# Patient Record
Sex: MALE | Race: WHITE
[De-identification: names, ages, dates, MRNs, and addresses within clinical notes are randomized per-mention and may not be internally consistent; named-entity substitution may affect disease eponyms.]

---

## 2017-01-12 ENCOUNTER — HOSPITAL ENCOUNTER (OUTPATIENT)
Dept: HOSPITAL 35 - PAIN | Age: 62
Discharge: HOME | End: 2017-01-12
Attending: ANESTHESIOLOGY
Payer: COMMERCIAL

## 2017-01-12 VITALS — HEIGHT: 74.02 IN | WEIGHT: 250 LBS | BODY MASS INDEX: 32.08 KG/M2

## 2017-01-12 VITALS — SYSTOLIC BLOOD PRESSURE: 143 MMHG | DIASTOLIC BLOOD PRESSURE: 93 MMHG

## 2017-01-12 DIAGNOSIS — M54.81: ICD-10-CM

## 2017-01-12 DIAGNOSIS — I10: ICD-10-CM

## 2017-01-12 DIAGNOSIS — F17.210: ICD-10-CM

## 2017-01-12 DIAGNOSIS — M54.12: Primary | ICD-10-CM

## 2017-01-12 LAB
INR PPP: 1
PROTHROMBIN TIME: 10.2 SECONDS (ref 9.3–11.4)

## 2017-01-13 ENCOUNTER — HOSPITAL ENCOUNTER (OUTPATIENT)
Dept: HOSPITAL 35 - PAIN | Age: 62
End: 2017-01-13
Attending: ANESTHESIOLOGY
Payer: COMMERCIAL

## 2017-01-13 VITALS — WEIGHT: 250 LBS | BODY MASS INDEX: 32.08 KG/M2 | HEIGHT: 74.02 IN

## 2017-01-13 VITALS — SYSTOLIC BLOOD PRESSURE: 137 MMHG | DIASTOLIC BLOOD PRESSURE: 85 MMHG

## 2017-01-13 DIAGNOSIS — M54.12: Primary | ICD-10-CM

## 2017-01-13 DIAGNOSIS — F17.210: ICD-10-CM

## 2017-01-13 DIAGNOSIS — I10: ICD-10-CM

## 2017-05-18 ENCOUNTER — HOSPITAL ENCOUNTER (OUTPATIENT)
Dept: HOSPITAL 35 - PAIN | Age: 62
End: 2017-05-18
Attending: ANESTHESIOLOGY
Payer: COMMERCIAL

## 2017-05-18 VITALS — SYSTOLIC BLOOD PRESSURE: 126 MMHG | DIASTOLIC BLOOD PRESSURE: 81 MMHG

## 2017-05-18 VITALS — WEIGHT: 252.38 LBS | HEIGHT: 74.02 IN | BODY MASS INDEX: 32.39 KG/M2

## 2017-05-18 DIAGNOSIS — M54.12: Primary | ICD-10-CM

## 2017-05-18 DIAGNOSIS — I10: ICD-10-CM

## 2017-05-18 LAB
INR PPP: 1.2
PROTHROMBIN TIME: 12 SECONDS (ref 9.3–11.4)

## 2017-09-07 ENCOUNTER — HOSPITAL ENCOUNTER (OUTPATIENT)
Dept: HOSPITAL 35 - PAIN | Age: 62
End: 2017-09-07
Attending: ANESTHESIOLOGY
Payer: COMMERCIAL

## 2017-09-07 VITALS — SYSTOLIC BLOOD PRESSURE: 136 MMHG | DIASTOLIC BLOOD PRESSURE: 86 MMHG

## 2017-09-07 VITALS — BODY MASS INDEX: 33.37 KG/M2 | WEIGHT: 260 LBS | HEIGHT: 74.02 IN

## 2017-09-07 DIAGNOSIS — I10: ICD-10-CM

## 2017-09-07 DIAGNOSIS — Z79.01: ICD-10-CM

## 2017-09-07 DIAGNOSIS — M54.12: Primary | ICD-10-CM

## 2017-09-07 DIAGNOSIS — Z98.890: ICD-10-CM

## 2017-09-07 DIAGNOSIS — Z79.899: ICD-10-CM

## 2017-09-07 DIAGNOSIS — Z88.8: ICD-10-CM

## 2017-09-07 LAB
INR PPP: 1.1
PROTHROMBIN TIME: 11.1 SECONDS (ref 9.3–11.4)

## 2017-10-19 ENCOUNTER — HOSPITAL ENCOUNTER (OUTPATIENT)
Dept: HOSPITAL 61 - PCVCIMAG | Age: 62
Discharge: HOME | End: 2017-10-19
Attending: INTERNAL MEDICINE
Payer: COMMERCIAL

## 2017-10-19 DIAGNOSIS — R19.09: ICD-10-CM

## 2017-10-19 DIAGNOSIS — Z72.0: ICD-10-CM

## 2017-10-19 DIAGNOSIS — I65.22: Primary | ICD-10-CM

## 2017-10-19 DIAGNOSIS — I87.2: ICD-10-CM

## 2017-10-19 DIAGNOSIS — Z79.01: ICD-10-CM

## 2017-10-19 DIAGNOSIS — R19.8: ICD-10-CM

## 2017-10-19 DIAGNOSIS — E78.5: ICD-10-CM

## 2017-10-19 DIAGNOSIS — M19.90: ICD-10-CM

## 2017-10-19 PROCEDURE — 93880 EXTRACRANIAL BILAT STUDY: CPT

## 2017-10-19 PROCEDURE — 93978 VASCULAR STUDY: CPT

## 2017-10-19 NOTE — PCVCIMAG
EXAM: BILATERAL CAROTID DUPLEX



INDICATION: Carotid Occlusive Disease.



FINDINGS: 

Doppler Measurements (centimeters per second):



RIGHT:  Peak CCA-87, Peak ECA-81, Diastolic ICA-40, Peak ICA-96,

ICA/CCA Ratio-1.1.



LEFT:  Peak CCA-90, Peak ECA-100, Diastolic ICA-32, Peak ICA-81,

ICA/CCA Ratio-0.9.



RIGHT CAROTID: The carotid bulb has no significant plaque. The

proximal internal carotid artery shows no significant stenosis. The

common carotid artery shows no significant stenosis. The external

carotid artery shows no significant stenosis.



LEFT CAROTID:  The carotid bulb has mild plaque. The proximal internal

carotid artery shows <40% stenosis. The common carotid artery shows no

significant stenosis. The external carotid artery shows no significant

stenosis.



Antegrade flow in both vertebral arteries.



IMPRESSION: 

No significant stenosis of the right internal carotid artery with no

significant plaque.

<40% stenosis of the left internal carotid artery with mild plaque.



LOC:[place] [id number]

## 2017-10-19 NOTE — PCVCIMAG
EXAM: AORTOILIAC DUPLEX



INDICATION: Palpable abdominal fullness. 



FINDINGS: 



AORTA: Suprarenal aorta measures maximum diameter of 2.9 cm. There is

not a fusiform infrarenal aortic aneurysm. The infrarenal aorta

measures maximum diameter of 2.5 cm. No aortic stenosis.



RIGHT COMMON ILIAC ARTERY: Maximum diameter is 1.1 cm. No significant

stenosis.



RIGHT EXTERNAL ILIAC ARTERY:  No significant stenosis.



LEFT COMMON ILIAC ARTERY: Maximum diameter is 1.3 cm.  No significant

stenosis.



LEFT EXTERNAL ILIAC ARTERY:  No significant stenosis.



IMPRESSION: 

No abdominal aortic aneurysm. No aortoiliac stenosis seen.





LOC:[id number]

## 2017-10-30 ENCOUNTER — HOSPITAL ENCOUNTER (OUTPATIENT)
Dept: HOSPITAL 61 - PCVCIMAG | Age: 62
Discharge: HOME | End: 2017-10-30
Attending: INTERNAL MEDICINE
Payer: COMMERCIAL

## 2017-10-30 DIAGNOSIS — I49.3: ICD-10-CM

## 2017-10-30 DIAGNOSIS — I42.8: Primary | ICD-10-CM

## 2017-10-30 DIAGNOSIS — E78.5: ICD-10-CM

## 2017-10-30 DIAGNOSIS — Z72.0: ICD-10-CM

## 2017-10-30 PROCEDURE — 93351 STRESS TTE COMPLETE: CPT

## 2017-10-30 PROCEDURE — 93325 DOPPLER ECHO COLOR FLOW MAPG: CPT

## 2017-10-30 NOTE — PCVCIMAG
--------------- APPROVED REPORT --------------





Exam:  Stress Echocardiogram

Indication: Nonischemic cardiomyopathy, tobacco use, HLP

Patient Location: Echo lab

Stress Nurse: Janelle Maldonado RN

Status: routine



Ht: 6 ft 2 in  

HR: 91 bpm      BP: 144/88 mmHg

Rhythm: NSR w/ PVCs



Procedure

The patient underwent an Exercise Stress Test using the Tay 

Protocol. Blood pressure, heart rate, and EKG were monitored.

An Echocardiogram was performed by technician in four stages in quad 

fashion.  At peak stress, four selected images were obtained and 

placed side by side with resting images for comparison.



Stress Test Details

Stress Test:  Exercise stress testing was performed using a Tay 

protocol.

HR

Resting HR:            91 bpmMax Heart Rate (APMHR): 160 bpm 

Max HR Achieved:  131 bpmTarget HR (85% APMHR): 136 bpm

% of APMHR:         81

Recovery HR:            100 bpm

HR response to stress: Normal HR response to stress



BP

Resting BP:  144/88 mmHg

Max BP:       168/78 mmHg

Recovery BP:       132/68 mmHg



ECG

Resting ECG:  Sinus Rhythm w/ isolated PVCs and couplet 

PVCs

Stress ECG:     Sinus Rhythm w/ isolated PVCs and couplet 

PVCs

ST Change: Normal

Arrhythmia:    isolated and couplet PVCs

Recovery ECG: Sinus Rhythm with PVCs

Recovery ST Change: Normal

Recovery Arrhythmia: Isolated and couplet PVCs



Clinical

Reason for Termination: Dyspnea, Maximal effort

Stress Symptoms: Dyspnea

Exercise duration: 7 min 54 sec

Highest Stage Achieved: Stage 3: 3.4 mph at 14% grade. 

Exercise capacity: 10.1 METs

Overall Exercise Capacity for Age: Average



Pre-Stress Echo

The resting Echocardiogram showed mildly decreased left ventricular 

contractility with an estimated Ejection Fraction of about 40-45%. 

Normal wall motion in all segments on baseline images. Globally 

mildly decreased systolic function due to known nonischemic 

cardiomyopathy.



Post-Stress Echo

The stress Echocardiogram showed normal left ventricular 

contractility with an estimated Ejection Fraction of about 50-55%. 

Normal augmentation of wall motion in all segments on post stress 

images.



Clinical

No clinical or ECG evidence for ischemia.



Conclusion

Clinical Response:  Non-ischemic

Exercise Capacity:  Average

Stress ECG Response:  Non-ischemic

Stress Echo Images:  Non-ischemic

The left ventricle is normal in size and wall thickness in both the 

rest and stress images.



Other Information

Study Quality: Adequate



&lt;Conclusion&gt;

The left ventricle is normal in size and wall thickness in both the 

rest and stress images.

## 2017-12-21 ENCOUNTER — HOSPITAL ENCOUNTER (OUTPATIENT)
Dept: HOSPITAL 35 - ULTRA | Age: 62
End: 2017-12-21
Attending: OTOLARYNGOLOGY
Payer: COMMERCIAL

## 2017-12-21 DIAGNOSIS — E04.1: Primary | ICD-10-CM

## 2018-02-08 ENCOUNTER — HOSPITAL ENCOUNTER (OUTPATIENT)
Dept: HOSPITAL 35 - PAIN | Age: 63
Discharge: HOME | End: 2018-02-08
Attending: ANESTHESIOLOGY
Payer: COMMERCIAL

## 2018-02-08 VITALS — DIASTOLIC BLOOD PRESSURE: 84 MMHG | SYSTOLIC BLOOD PRESSURE: 146 MMHG

## 2018-02-08 VITALS — HEIGHT: 74.02 IN | WEIGHT: 261.2 LBS | BODY MASS INDEX: 33.52 KG/M2

## 2018-02-08 DIAGNOSIS — F32.9: ICD-10-CM

## 2018-02-08 DIAGNOSIS — M54.12: Primary | ICD-10-CM

## 2018-02-08 DIAGNOSIS — F17.210: ICD-10-CM

## 2018-02-08 DIAGNOSIS — F41.9: ICD-10-CM

## 2018-02-08 DIAGNOSIS — Z79.899: ICD-10-CM

## 2018-02-08 LAB
INR PPP: 1.1
PROTHROMBIN TIME: 10.9 SECONDS (ref 9.3–11.4)

## 2018-06-04 ENCOUNTER — HOSPITAL ENCOUNTER (OUTPATIENT)
Dept: HOSPITAL 61 - PCVCIMAG | Age: 63
Discharge: HOME | End: 2018-06-04
Attending: INTERNAL MEDICINE
Payer: COMMERCIAL

## 2018-06-04 DIAGNOSIS — M79.89: ICD-10-CM

## 2018-06-04 DIAGNOSIS — Z86.718: ICD-10-CM

## 2018-06-04 DIAGNOSIS — M79.604: ICD-10-CM

## 2018-06-04 DIAGNOSIS — M79.605: Primary | ICD-10-CM

## 2018-06-04 PROCEDURE — 93970 EXTREMITY STUDY: CPT

## 2018-06-11 ENCOUNTER — HOSPITAL ENCOUNTER (OUTPATIENT)
Dept: HOSPITAL 35 - PAIN | Age: 63
Discharge: HOME | End: 2018-06-11
Attending: ANESTHESIOLOGY
Payer: COMMERCIAL

## 2018-06-11 VITALS — SYSTOLIC BLOOD PRESSURE: 112 MMHG | DIASTOLIC BLOOD PRESSURE: 77 MMHG

## 2018-06-11 VITALS — BODY MASS INDEX: 35.47 KG/M2 | HEIGHT: 74.02 IN | WEIGHT: 276.4 LBS

## 2018-06-11 DIAGNOSIS — M54.12: Primary | ICD-10-CM

## 2018-06-11 DIAGNOSIS — Z79.01: ICD-10-CM

## 2018-06-11 DIAGNOSIS — Z88.8: ICD-10-CM

## 2018-06-11 DIAGNOSIS — I10: ICD-10-CM

## 2018-06-11 DIAGNOSIS — F17.210: ICD-10-CM

## 2018-06-11 DIAGNOSIS — Z98.890: ICD-10-CM

## 2018-06-11 DIAGNOSIS — G89.29: ICD-10-CM

## 2018-06-11 DIAGNOSIS — Z79.891: ICD-10-CM

## 2018-06-11 LAB
INR PPP: 1
PROTHROMBIN TIME: 10.7 SECONDS (ref 9.3–11.4)

## 2018-10-25 ENCOUNTER — HOSPITAL ENCOUNTER (OUTPATIENT)
Dept: HOSPITAL 35 - PAIN | Age: 63
Discharge: HOME | End: 2018-10-25
Attending: ANESTHESIOLOGY
Payer: COMMERCIAL

## 2018-10-25 VITALS — DIASTOLIC BLOOD PRESSURE: 69 MMHG | SYSTOLIC BLOOD PRESSURE: 106 MMHG

## 2018-10-25 VITALS — BODY MASS INDEX: 33.37 KG/M2 | WEIGHT: 260 LBS | HEIGHT: 74.02 IN

## 2018-10-25 DIAGNOSIS — Z79.899: ICD-10-CM

## 2018-10-25 DIAGNOSIS — M54.12: Primary | ICD-10-CM

## 2018-10-25 DIAGNOSIS — Z88.8: ICD-10-CM

## 2018-10-25 DIAGNOSIS — F17.210: ICD-10-CM

## 2018-10-25 DIAGNOSIS — I10: ICD-10-CM

## 2018-10-25 DIAGNOSIS — Z79.01: ICD-10-CM

## 2018-10-25 LAB
INR PPP: 1
PROTHROMBIN TIME: 10.7 SECONDS (ref 9.3–11.4)

## 2019-03-04 ENCOUNTER — HOSPITAL ENCOUNTER (OUTPATIENT)
Dept: HOSPITAL 35 - PAIN | Age: 64
Discharge: HOME | End: 2019-03-04
Attending: ANESTHESIOLOGY
Payer: COMMERCIAL

## 2019-03-04 VITALS — HEIGHT: 74.02 IN | BODY MASS INDEX: 33.5 KG/M2 | WEIGHT: 261 LBS

## 2019-03-04 VITALS — SYSTOLIC BLOOD PRESSURE: 107 MMHG | DIASTOLIC BLOOD PRESSURE: 62 MMHG

## 2019-03-04 DIAGNOSIS — Z88.8: ICD-10-CM

## 2019-03-04 DIAGNOSIS — I10: ICD-10-CM

## 2019-03-04 DIAGNOSIS — Z79.01: ICD-10-CM

## 2019-03-04 DIAGNOSIS — M54.12: Primary | ICD-10-CM

## 2019-03-04 DIAGNOSIS — G89.29: ICD-10-CM

## 2019-03-04 DIAGNOSIS — F17.210: ICD-10-CM

## 2019-03-04 DIAGNOSIS — Z79.899: ICD-10-CM

## 2019-03-04 LAB
INR PPP: 1.1
PROTHROMBIN TIME: 11.3 SECONDS (ref 9.3–11.4)

## 2019-03-04 NOTE — NUR
Pain Clinic Assessment:
 
1. History of Osteoarthritis:
Left Lower Extremity
Right Lower Extremity
   History of Rheumatoid Arthritis:
Not Applicable
 
2. Height: 6 ft. 2 in. 188.0 cm.
   Weight: 261.0 lb.  oz. 118.389 kg.
   Patient's BMI: 33.5
 
3. Vital Signs:
   BP: 107/62 Pulse: 84 Resp: 16
   Temp:  02 Sat: 97 ECG Mon:
 
4. Pain Intensity: 7-8
 
5. Fall Risk:
   Dizziness: N  Needs help standing or walking: N
   Fallen in the last 3 months: N
   Fall risk comments:
 
 
6. Patient on Blood Thinner: Warfarin (Coumadin)
 
7. History of Hypertension: Y
 
8. Opioid Therapy greater than 6 weeks: Y
   Opiate Contract Signed: 06/11/18
 
9. Risk Assessment Tool Provided: LOW RISK 0/3
 
10. Functional Assessment Tool: 32/70
 
11. Recreational Drug Use: Never Drug Type:
    Tobacco Use: Current Every Day Smoker Tobacco Type: Cigarettes
       Amount or Packs/day: 1/2 How Many Years:
    Alcohol Use: No  Frequency:  Quant:

## 2019-07-29 ENCOUNTER — HOSPITAL ENCOUNTER (OUTPATIENT)
Dept: HOSPITAL 35 - PAIN | Age: 64
Discharge: HOME | End: 2019-07-29
Attending: ANESTHESIOLOGY
Payer: COMMERCIAL

## 2019-07-29 VITALS — BODY MASS INDEX: 32.67 KG/M2 | HEIGHT: 74.02 IN | WEIGHT: 254.6 LBS

## 2019-07-29 VITALS — DIASTOLIC BLOOD PRESSURE: 50 MMHG | SYSTOLIC BLOOD PRESSURE: 102 MMHG

## 2019-07-29 DIAGNOSIS — Z88.8: ICD-10-CM

## 2019-07-29 DIAGNOSIS — Z79.899: ICD-10-CM

## 2019-07-29 DIAGNOSIS — M54.12: Primary | ICD-10-CM

## 2019-07-29 DIAGNOSIS — Z79.01: ICD-10-CM

## 2019-07-29 DIAGNOSIS — F17.210: ICD-10-CM

## 2019-07-29 DIAGNOSIS — Z79.891: ICD-10-CM

## 2019-07-29 DIAGNOSIS — G89.29: ICD-10-CM

## 2019-07-29 DIAGNOSIS — Z98.890: ICD-10-CM

## 2019-07-29 LAB
INR PPP: 1.1
PROTHROMBIN TIME: 11.4 SECONDS (ref 9.3–11.4)

## 2019-07-29 NOTE — NUR
Pain Clinic Assessment:
 
1. History of Osteoarthritis:
Left Lower Extremity
Right Lower Extremity
   History of Rheumatoid Arthritis:
Not Applicable
 
2. Height: 6 ft. 2 in. 188.0 cm.
   Weight: 254.6 lb.  oz. 115.486 kg.
   Patient's BMI: 32.7
 
3. Vital Signs:
   BP: 102/50 Pulse: 86 Resp: 14
   Temp:  02 Sat: 97 ECG Mon:
 
4. Pain Intensity: 9
 
5. Fall Risk:
   Dizziness: N  Needs help standing or walking: N
   Fallen in the last 3 months: N
   Fall risk comments:
 
 
6. Patient on Blood Thinner: Warfarin (Coumadin)
 
7. History of Hypertension: Y
 
8. Opioid Therapy greater than 6 weeks: Y
   Opiate Contract Signed: 06/11/18
 
9. Risk Assessment Tool Provided: LOW RISK 0/3
 
10. Functional Assessment Tool: 32/70
 
11. Recreational Drug Use: Never Drug Type:
    Tobacco Use: Current Every Day Smoker Tobacco Type: Cigarettes
       Amount or Packs/day: 1/2 PACK DAY How Many Years: 30
    Alcohol Use: No  Frequency:  Quant:

## 2019-12-19 ENCOUNTER — HOSPITAL ENCOUNTER (OUTPATIENT)
Dept: HOSPITAL 35 - PAIN | Age: 64
Discharge: HOME | End: 2019-12-19
Attending: ANESTHESIOLOGY
Payer: COMMERCIAL

## 2019-12-19 VITALS — HEIGHT: 74.02 IN | WEIGHT: 244.6 LBS | BODY MASS INDEX: 31.39 KG/M2

## 2019-12-19 VITALS — SYSTOLIC BLOOD PRESSURE: 94 MMHG | DIASTOLIC BLOOD PRESSURE: 62 MMHG

## 2019-12-19 DIAGNOSIS — I10: ICD-10-CM

## 2019-12-19 DIAGNOSIS — M19.90: ICD-10-CM

## 2019-12-19 DIAGNOSIS — M54.12: Primary | ICD-10-CM

## 2019-12-19 DIAGNOSIS — F17.210: ICD-10-CM

## 2019-12-19 DIAGNOSIS — Z79.899: ICD-10-CM

## 2019-12-19 DIAGNOSIS — Z98.890: ICD-10-CM

## 2019-12-19 DIAGNOSIS — M54.2: ICD-10-CM

## 2019-12-19 DIAGNOSIS — Z88.8: ICD-10-CM

## 2019-12-19 LAB
INR PPP: 1.1
PROTHROMBIN TIME: 11.2 SECONDS (ref 9.3–11.4)

## 2019-12-19 NOTE — NUR
Pain Clinic Assessment:
 
1. History of Osteoarthritis:
Left Lower Extremity
Right Lower Extremity
   History of Rheumatoid Arthritis:
Not Applicable
 
2. Height: 6 ft. 2 in. 188.0 cm.
   Weight: 244.6 lb.  oz. 110.950 kg.
   Patient's BMI: 31.4
 
3. Vital Signs:
   BP: 94/62 Pulse: 79 Resp: 16
   Temp:  02 Sat: 97 ECG Mon:
 
4. Pain Intensity: 7
 
5. Fall Risk:
   Dizziness: N  Needs help standing or walking: N
   Fallen in the last 3 months: N
   Fall risk comments:
 
 
6. Patient on Blood Thinner: Warfarin (Coumadin)
 
7. History of Hypertension: Y
 
8. Opioid Therapy greater than 6 weeks: Y
   Opiate Contract Signed: 06/11/18
 
9. Risk Assessment Tool Provided: LOW RISK 0/3
 
10. Functional Assessment Tool: 32/70
 
11. Recreational Drug Use: Never Drug Type:
    Tobacco Use: Current Every Day Smoker Tobacco Type: Cigarettes
       Amount or Packs/day:  How Many Years:
    Alcohol Use: No  Frequency:  Quant:

## 2019-12-20 ENCOUNTER — HOSPITAL ENCOUNTER (OUTPATIENT)
Dept: HOSPITAL 35 - ULTRA | Age: 64
End: 2019-12-20
Attending: OTOLARYNGOLOGY
Payer: COMMERCIAL

## 2019-12-20 DIAGNOSIS — E04.2: Primary | ICD-10-CM

## 2019-12-30 NOTE — HPC
[facility]
[address]
[place]   [address]                     PAIN MANAGEMENT CONSULTATION  
_______________________________________________________________________________
 
Name:       SADIA PURDY           Room #:                     REG CLI 
ROSALVA.#:      [id number]                       Account #:      [id number]  
Admission:  12/19/19    Attend Phys:    Fabio Baez MD 
Discharge:              Date of Birth:  [birth year]  
                                                          Report #: [id number]
                                                                    [id number]   
_______________________________________________________________________________
THIS REPORT FOR:   //name//                          
 
CC: Fabio Cadet
 
DATE OF SERVICE:  12/19/2019
 
 
Followup visit for cervical radiculopathy.
 
The patient returns to pain clinic today for cervical epidural injection.  The
record will reflect that he has received periodic injections dating back to
2016, always receiving some improvement.  He reports today complaining to us
that the pain has returned.  It is once again in his neck, radiates through his
right shoulder down the right arm and into his right hand.  He is having
difficulty with gripping things due to weakness.
 
Typically after an injection, he usually gets about 5 minutes of pain relief.
 
He does not take much pain medicine, but he is quite concerned about it.  I have
reviewed his use on the prescription drug monitoring program information.  It
does show that he has had several prescribers.  It also shows that 18 months
ago, he received a large prescription for alprazolam which Dr. Guzman gave him
from a Cardiology standpoint to help reduce stress hoping that this would be
helpful for cardiac issues.  He did not like them, did not take them on a
regular basis and they were the lowest dose of 0.25.  Nonetheless, this led to a
long discussion regarding the use of opioids and also the inclusion of
alprazolam, a benzodiazepine on his PDMP.
 
Other than the prescriptions that I provide for him, oxycodone 5/325 about every
6 months, 30 tablets.  He has received a prescription from Dr. Darian Cadet
following surgery and also a prescription from Dr. Harley Guevara when he suffered
with shingles in April.  He is on an opioid agreement with our clinic.  He has
acknowledged that he took these medications and also communicated this with our
clinic.  He does not appear to have any issues related to misuse or abuse or
drug addiction.  He has completed an opioid risk tool and his score is 0.  As
long as we are aware of these other prescriptions and they are not being done on
a chronic basis and he is using that for a legitimate purpose, I told him that I
was not overly concerned.  We performed urine drug screens in the past and there
are no unexpected findings.
 
Today, his biggest problem is pain in his neck that radiates down his arm as
described.  We will repeat the injection.  He discontinued his Coumadin and his
INR is 1.1.
 
PQRS REVIEW:  He has some arthritis in his lower extremities, hips and knees. 
 
 
 
[address]   [address]                     PAIN MANAGEMENT CONSULTATION  
_______________________________________________________________________________
 
Name:       SADIA PURDY           Room #:                     REG CLLATONIA BLACKWELL.#:      [id number]                       Account #:      [id number]  
Admission:  12/19/19    Attend Phys:    Fabio Baez MD 
Discharge:              Date of Birth:  [birth year]  
                                                          Report #: [id number]
                                                                    [id number]   
_______________________________________________________________________________
BMI is 31.4 and his blood pressure is 94/62, heart rate 79.  Pain intensity is
7/10 today.  He is not considered a fall risk.  He does have hypertension and is
under treatment.  Opioid risk tool was discussed above.  He also has a 0 score
for opioid risk tool.  He continues to smoke and was counseled to not use
alcohol.
 
PHYSICAL EXAMINATION:  Cervicalgia with pain in the neck.  Positive Spurling's
test.  Tenderness across the cervical segment.  Weakness radiating into the
right arm.  Deep tendon reflexes are diminished in the lower extremities.
 
IMPRESSION:  Cervical radiculopathy, C6-C7.
 
RECOMMENDATION:  Repeat cervical epidural injection under fluoroscopic guidance.
 
DESCRIPTION OF PROCEDURE:  He was taken to fluoroscopic suite, placed prone,
skin prepped with ChloraPrep.  Skin anesthetized over C7-T1.  A 20-gauge Tuohy
epidural needle advanced on the first attempt in the epidural space with loss of
resistance.  There was no blood or CSF aspirated.  A 1 mL of Omnipaque injected
and good spread of dye observed into the epidural space.  This was then followed
by 3 mL of 0.5% lidocaine mixed with 80 mg of triamcinolone and he tolerated the
procedure well.  There were no complications.
 
Followup visit planned on an as needed basis in the future for future injections
and I did renew his medications for him electronically.  A #30 oxycodone 5/325
to be taken only for severe pain.  This typically again lasts about 6 months.
 
 
 
 
 
 
 
 
 
 
 
 
 
 
 
 
 
 
 
  <ELECTRONICALLY SIGNED>
   By: Fabio Baez MD         
  12/30/19     1221
D: 12/19/19 1756                           _____________________________________
T: 12/19/19 6081                           Fabio Baez MD           /nt

## 2020-04-30 ENCOUNTER — HOSPITAL ENCOUNTER (OUTPATIENT)
Dept: HOSPITAL 35 - PAIN | Age: 65
End: 2020-04-30
Attending: ANESTHESIOLOGY
Payer: COMMERCIAL

## 2020-04-30 VITALS — WEIGHT: 248 LBS | BODY MASS INDEX: 31.83 KG/M2 | HEIGHT: 74.02 IN

## 2020-04-30 VITALS — DIASTOLIC BLOOD PRESSURE: 70 MMHG | SYSTOLIC BLOOD PRESSURE: 106 MMHG

## 2020-04-30 DIAGNOSIS — M48.02: ICD-10-CM

## 2020-04-30 DIAGNOSIS — G96.19: ICD-10-CM

## 2020-04-30 DIAGNOSIS — M54.12: Primary | ICD-10-CM

## 2020-04-30 DIAGNOSIS — F11.20: ICD-10-CM

## 2020-04-30 DIAGNOSIS — Z79.899: ICD-10-CM

## 2020-04-30 NOTE — HPC
[facility]
[address]
[place]   [address]                     PAIN MANAGEMENT CONSULTATION  
_______________________________________________________________________________
 
Name:       SADIA PURDY           Room #:                     REG ROBERT 
YONG#:      [id number]                       Account #:      [id number]  
Admission:  04/30/20    Attend Phys:    Fabio Baez MD 
Discharge:              Date of Birth:  [birth year]  
                                                          Report #: [id number]
                                                                    [id number]   
_______________________________________________________________________________
THIS REPORT FOR:  
 
cc:  Darian Cadet MD, Scott R. MD Morgan, Richard L. MD                                         ~
CC: Fabio Cadet MD
 
DATE OF SERVICE:  04/30/2020
 
 
Followup visit for chronic cervical radiculopathy.
 
The patient is here today to discuss future injection and also to review his
medications.  He fell while he was in session in [place] and went to the
Emergency Room.  The doctor there provided him with a prescription for
hydrocodone 5/325, 12 tablets.
 
He is very conscientious about his use of opioid medication given the opioid
crisis.  His last prescription was provided in our clinic on 12/19/2019 and
filled on the same day for oxycodone 5/325, 30 tablets.  It has been 150 days
since his last refill.  He basically takes an oxycodone when the pain is very
severe and that calculates out to about one tablet every 5 days.  His MME was
less than 2.
 
In the past, he was taking alprazolam 0.25 mg prescribed by Dr. Guzman.  He
used this to help with sleep, stress and anxiety, but after we had talked about
the opioid and the benzodiazepine interaction ____ completely stopped and has
not taken it since.  We had a lengthy discussion today about the importance of
dosing and proper use of medications to prevent injury or harm.  It is my
opinion that his doses of medication are so low that if he takes them as
prescribed that his risk of overdose is extremely low.
 
He is on an opioid agreement.  We reviewed the terms of that agreement.  He has
completed the PQRS review as well.  He has some osteoarthritis of the lower
extremity as well as spondylosis of the cervical and lumbar spine.  His BMI is
31.8, blood pressure 106/70, heart rate 87, pain intensity 7-8/10.  He has not
fallen.  He is on Coumadin, needs to be off of it in anticipation of an
injection.  He is treated for hypertension, and I have reviewed all medications.
 His opioid agreement was signed in 2018 and discussed above.  He is at low risk
for addiction by the opioid risk assessment tool, which scores 0.  He continues
to smoke and was counseled.  He does not use alcohol.
 
PHYSICAL EXAMINATION:
GENERAL:  Pleasant, alert and oriented.
 
 
 
[address]   [address]                     PAIN MANAGEMENT CONSULTATION  
_______________________________________________________________________________
 
Name:       SADIA PURDY           Room #:                     REG [facility].#:      [id number]                       Account #:      [id number]  
Admission:  04/30/20    Attend Phys:    Fabio Baez MD 
Discharge:              Date of Birth:  [birth year]  
                                                          Report #: [id number]
                                                                    [id number]   
_______________________________________________________________________________
VITAL SIGNS:  As noted above.  Moves independently without difficulty.  His gait
is normal without spasticity.
MUSCULOSKELETAL:  Cervical range of motion, increased pain and extension
reproducing some radicular symptoms continuous into the right arm as we have
seen before.  He has a positive Spurling sign.  Strength and sensation are
normal.
 
IMPRESSION:  Chronic cervical radiculopathy, right C6-C7 distribution, which has
been very responsive since epidural injections in the past.  His MRI dated ____
shows mild neural foraminal narrowing on the right at C4-C5, C5-C6 and a
perineural cyst noted at C5-C6 on the right as well.
 
PLAN:  I will renew a prescription for 30 of his oxycodone, which he will use
carefully and cautiously as described.  Followup visit for cervical epidural
injection in 1-2 weeks.  We will monitor COVID-19 carefully as we plan the
injection.
 
 
 
 
 
 
 
 
 
 
 
 
 
 
 
 
 
 
 
 
 
 
 
 
 
 
 
 
                         
   By:                               
                   
D: 04/30/20 1159                           _____________________________________
T: 04/30/20 1434                           Fabio Baez MD           /nt

## 2020-04-30 NOTE — NUR
Pain Clinic Assessment:
 
1. History of Osteoarthritis:
Left Lower Extremity
Right Lower Extremity
   History of Rheumatoid Arthritis:
Not Applicable
 
2. Height: 6 ft. 2 in. 188.0 cm.
   Weight: 248.0 lb.  oz. 112.492 kg.
   Patient's BMI: 31.8
 
3. Vital Signs:
   BP: 106/70 Pulse: 87 Resp: 16
   Temp:  02 Sat: 96 ECG Mon:
 
4. Pain Intensity: 7-8
 
5. Fall Risk:
   Dizziness: N  Needs help standing or walking: N
   Fallen in the last 3 months: N
   Fall risk comments:
 
 
6. Patient on Blood Thinner: Warfarin (Coumadin)
 
7. History of Hypertension: Y
 
8. Opioid Therapy greater than 6 weeks: Y
   Opiate Contract Signed: 06/11/18
 
9. Risk Assessment Tool Provided: LOW RISK 0/3
 
10. Functional Assessment Tool: 32/70
 
11. Recreational Drug Use: Never Drug Type:
    Tobacco Use: Current Every Day Smoker Tobacco Type: Cigarettes
       Amount or Packs/day:  How Many Years:
    Alcohol Use: No  Frequency:  Quant:

## 2020-05-11 ENCOUNTER — HOSPITAL ENCOUNTER (OUTPATIENT)
Dept: HOSPITAL 35 - PAIN | Age: 65
Discharge: HOME | End: 2020-05-11
Attending: ANESTHESIOLOGY
Payer: COMMERCIAL

## 2020-05-11 VITALS — WEIGHT: 248 LBS | HEIGHT: 74.02 IN | BODY MASS INDEX: 31.83 KG/M2

## 2020-05-11 VITALS — DIASTOLIC BLOOD PRESSURE: 66 MMHG | SYSTOLIC BLOOD PRESSURE: 101 MMHG

## 2020-05-11 DIAGNOSIS — M54.12: Primary | ICD-10-CM

## 2020-05-11 DIAGNOSIS — Z98.890: ICD-10-CM

## 2020-05-11 DIAGNOSIS — Z79.899: ICD-10-CM

## 2020-05-11 DIAGNOSIS — F17.210: ICD-10-CM

## 2020-05-11 DIAGNOSIS — I10: ICD-10-CM

## 2020-05-11 DIAGNOSIS — Z79.01: ICD-10-CM

## 2020-05-11 DIAGNOSIS — Z88.8: ICD-10-CM

## 2020-05-11 LAB
INR PPP: 1.1
PROTHROMBIN TIME: 11.5 SECONDS (ref 9.3–11.4)

## 2020-05-11 NOTE — NUR
Pain Clinic Assessment:
 
1. History of Osteoarthritis:
Left Lower Extremity
Right Lower Extremity
   History of Rheumatoid Arthritis:
Not Applicable
 
2. Height:  ft.  in.  cm.
   Weight:  lb.  oz.  kg.
   Patient's BMI:
 
3. Vital Signs:
   BP:  Pulse:  Resp:
   Temp:  02 Sat:  ECG Mon:
 
4. Pain Intensity: 8
 
5. Fall Risk:
   Dizziness:   Needs help standing or walking:
   Fallen in the last 3 months:
   Fall risk comments:
 
 
6. Patient on Blood Thinner: Warfarin (Coumadin)
 
7. History of Hypertension: Y
 
8. Opioid Therapy greater than 6 weeks: Y
   Opiate Contract Signed: 06/11/18
 
9. Risk Assessment Tool Provided: LOW RISK 0/3
 
10. Functional Assessment Tool: 32/70
 
11. Recreational Drug Use: Never Drug Type:
    Tobacco Use: Current Every Day Smoker Tobacco Type:
       Amount or Packs/day:  How Many Years:
    Alcohol Use: No  Frequency:  Quant:

## 2020-06-26 ENCOUNTER — HOSPITAL ENCOUNTER (OUTPATIENT)
Dept: HOSPITAL 35 - SJCVCIMAG | Age: 65
End: 2020-06-26
Attending: INTERNAL MEDICINE
Payer: COMMERCIAL

## 2020-06-26 DIAGNOSIS — I77.811: Primary | ICD-10-CM

## 2020-09-24 ENCOUNTER — HOSPITAL ENCOUNTER (OUTPATIENT)
Dept: HOSPITAL 35 - PAIN | Age: 65
End: 2020-09-24
Attending: ANESTHESIOLOGY
Payer: COMMERCIAL

## 2020-09-24 VITALS — SYSTOLIC BLOOD PRESSURE: 101 MMHG | DIASTOLIC BLOOD PRESSURE: 64 MMHG

## 2020-09-24 VITALS — HEIGHT: 74.02 IN | BODY MASS INDEX: 32.11 KG/M2 | WEIGHT: 250.2 LBS

## 2020-09-24 DIAGNOSIS — M54.12: Primary | ICD-10-CM

## 2020-09-24 DIAGNOSIS — M79.601: ICD-10-CM

## 2020-09-24 NOTE — NUR
Pain Clinic Assessment:
 
1. History of Osteoarthritis:
Left Lower Extremity
Right Lower Extremity
   History of Rheumatoid Arthritis:
Not Applicable
 
2. Height: 6 ft. 2 in. 188.0 cm.
   Weight: 250.2 lb.  oz. 113.490 kg.
   Patient's BMI: 32.1
 
3. Vital Signs:
   BP: 101/64 Pulse: 88 Resp: 16
   Temp:  02 Sat: 98 ECG Mon:
 
4. Pain Intensity: 8-9
 
5. Fall Risk:
   Dizziness: N  Needs help standing or walking: N
   Fallen in the last 3 months: N
   Fall risk comments:
 
 
6. Patient on Blood Thinner: Warfarin (Coumadin)
 
7. History of Hypertension: Y
 
8. Opioid Therapy greater than 6 weeks: Y
   Opiate Contract Signed: 06/11/18
 
9. Risk Assessment Tool Provided: LOW RISK 0/3
 
10. Functional Assessment Tool: 30/70
 
11. Recreational Drug Use: Never Drug Type:
    Tobacco Use: Current Every Day Smoker Tobacco Type: Cigarettes
       Amount or Packs/day: 1/2 PPD How Many Years:
    Alcohol Use: No  Frequency:  Quant:

## 2020-09-24 NOTE — HPC
[facility]
[address] [place]   [address]                     PAIN MANAGEMENT CONSULTATION  
_______________________________________________________________________________
 
Name:       SADIA PURDY           Room #:                     REG ROBERT BENJAMINAB.#:      [id number]                       Account #:      [id number]  
Admission:  09/24/20    Attend Phys:    Fabio Baez MD 
Discharge:              Date of Birth:  [birth year]  
                                                          Report #: [id number]
                                                                    [id number]   
_______________________________________________________________________________
THIS REPORT FOR:  
 
cc:  Darian Cadet MD, Scott R. MD Morgan,Fabio TORRES MD                                         ~
CC: Fabio Cadet
    _____ _____ 
 
DATE OF SERVICE:  09/24/2020
 
 
CHIEF COMPLAINT:  Neck pain radiating into the right arm, cervical
radiculopathy.
 
The patient returns to pain clinic today in followup.  He has longstanding
cervical radiculopathy, has responded beautifully to epidural injections
performed no more than 3 times to 4 times a year.  I have been treating him
dating back about 4-1/2 to 5 years now.  Typically, he returns to the pain
clinic for injection and is off.  He continues to report that his pain relief is
fairly quick and substantial after the injection and last months at a time.  We
have an MRI, but it is a bit dated.  I did not plan on ordering another MRI
unless there were changes in his symptoms or response to the injections.  He has
multilevel degenerative changes.
 
We have had quite a time with insurance.  He changed from his prior insurance at
[employer] to a [employer] product and my staff spent
literally hours trying to preauthorize him for this helpful injection. 
Ultimately, I asked for a [id number] and spoke with Dr. Ortiz.  He was
quite rigid and despite a thorough explanation of my treatment plan reported
that he would not deviate from his requirement, which is to only allow a
cervical epidural injection if a new MRI was ordered and received.  I have
ordered the MRI and he has it scheduled for next Tuesday.  We will see if this
changes our treatment options.  We did discuss the possibility of a surgical
intervention, which he is steadfastly against at this point in time.
 
He is uncomfortable today, scores his pain as 8-9/10.  Pain radiates from his
neck down into his arms.  He does get some relief from half a tablet of
oxycodone and uses it infrequently, not even daily, so I am willing to continue
providing that for him.  We certainly would like to continue using injections,
so he does not become more reliant on opioids.
 
PHYSICAL EXAMINATION:
GENERAL:  He is a pleasant gentleman, 6 feet 2 inches, 250, BMI 32.1.
VITAL SIGNS:  Blood pressure 101/64, heart rate 88, respirations 16, O2 sat 98.
HEENT:  Normal.
 
 
 
[address]   [address]                     PAIN MANAGEMENT CONSULTATION  
_______________________________________________________________________________
 
Name:       SADIA PURDY           Room #:                     REG ROBERT BEACH#:      [id number]                       Account #:      [id number]  
Admission:  09/24/20    Attend Phys:    Fabio Baez MD 
Discharge:              Date of Birth:  [birth year]  
                                                          Report #: [id number]
                                                                    [id number]   
_______________________________________________________________________________
NECK:  Remains supple.  He has some pain with lateral tilt to the affected side
on the right and with rotational movements.  There is a positive Spurling's
test, radiating pain into the right arm.
CHEST:  Clear.
CARDIAC:  Rhythm is regular.
MUSCULOSKELETAL:  Examination of reflexes reveals biceps, triceps and
brachioradialis reflexes to be 1-2+ in both arms.  In the lower extremities, he
has 2-3+ knee jerk reflex and the Achilles reflex is diminished to trace
bilaterally and symmetrical.
 
He smells a bit of tobacco and reports the use of 1 pack of cigarettes per day.
 
IMPRESSION:  Cervical radiculopathy.
 
PLAN:
1.  We will go ahead and get the MRI as mandated by his insurance company.
2.  Return off of his Coumadin with an INR under 1.5 to repeat this helpful
procedure, the epidural steroid injection at C6-C7.
 
 
 
 
 
 
 
 
 
 
 
 
 
 
 
 
 
 
 
 
 
 
 
 
 
 
                         
   By:                               
                   
D: 09/24/20 1418                           _____________________________________
T: 09/24/20 1533                           Fabio Baez MD           /nt

## 2020-10-05 ENCOUNTER — HOSPITAL ENCOUNTER (OUTPATIENT)
Dept: HOSPITAL 35 - PAIN | Age: 65
End: 2020-10-05
Attending: ANESTHESIOLOGY
Payer: COMMERCIAL

## 2020-10-05 VITALS — SYSTOLIC BLOOD PRESSURE: 99 MMHG | DIASTOLIC BLOOD PRESSURE: 69 MMHG

## 2020-10-05 VITALS — WEIGHT: 251 LBS | BODY MASS INDEX: 32.21 KG/M2 | HEIGHT: 74.02 IN

## 2020-10-05 DIAGNOSIS — M19.90: ICD-10-CM

## 2020-10-05 DIAGNOSIS — I10: ICD-10-CM

## 2020-10-05 DIAGNOSIS — M54.12: Primary | ICD-10-CM

## 2020-10-05 DIAGNOSIS — Z79.899: ICD-10-CM

## 2020-10-05 DIAGNOSIS — Z88.8: ICD-10-CM

## 2020-10-05 DIAGNOSIS — F17.210: ICD-10-CM

## 2020-10-05 LAB
INR PPP: 1.1
PROTHROMBIN TIME: 11.2 SECONDS (ref 9.3–11.4)

## 2020-10-05 NOTE — NUR
Pain Clinic Assessment:
 
1. History of Osteoarthritis:
Left Lower Extremity
Right Lower Extremity
   History of Rheumatoid Arthritis:
Not Applicable
 
2. Height: 6 ft. 2 in. 188.0 cm.
   Weight: 251.0 lb.  oz. 113.853 kg.
   Patient's BMI: 32.2
 
3. Vital Signs:
   BP: 99/69 Pulse: 85 Resp: 16
   Temp:  02 Sat: 99 ECG Mon:
 
4. Pain Intensity: 8-9
 
5. Fall Risk:
   Dizziness: N  Needs help standing or walking: N
   Fallen in the last 3 months: N
   Fall risk comments:
 
 
6. Patient on Blood Thinner: Warfarin (Coumadin)
 
7. History of Hypertension: Y
 
8. Opioid Therapy greater than 6 weeks: Y
   Opiate Contract Signed: 06/11/18
 
9. Risk Assessment Tool Provided: LOW RISK 0/3
 
10. Functional Assessment Tool: 30/70
 
11. Recreational Drug Use: Never Drug Type:
    Tobacco Use: Current Every Day Smoker Tobacco Type:
       Amount or Packs/day:  How Many Years:
    Alcohol Use: No  Frequency:  Quant:

## 2020-10-05 NOTE — HPC
[facility]
[address]
[place]   [address]                     PAIN MANAGEMENT CONSULTATION  
_______________________________________________________________________________
 
Name:       SADIA PURDY           Room #:                     REG CLLATONIA 
ROSALVA.#:      [id number]                       Account #:      [id number]  
Admission:  10/05/20    Attend Phys:    Fabio Baez MD 
Discharge:              Date of Birth:  [birth year]  
                                                          Report #: [id number]
                                                                    [id number]   
_______________________________________________________________________________
CC: Fabio Cadet
 
DATE OF SERVICE:  10/05/2020
 
 
Followup visit for cervical radiculopathy.  After much effort, we have finally
gotten the patient back to the clinic for cervical epidural injection.  We were
required by the insurance company to repeat the MRI.  I have had a chance to
review it.  It shows that there are degenerative changes predominantly in the
facet joints resulting in multilevel neural foraminal narrowing.  There is no
central stenosis, which is fortunate and we did not expect to see that.  His
symptoms are likely related to neural foraminal narrowing, particularly on the
right, which is most prominent at C4-C5, C5-C6.  Prior injections have provided
relief generally within a few days.
 
I reviewed the procedure with him and potential risks and benefits.  There have
been no significant changes in his pain and it continues to be quite bothersome
with an intensity of 8-9/10.
 
PHYSICAL EXAMINATION:  Today, blood pressure 99/69, heart rate 85, respirations
16, O2 sat 99, 6 feet 2 inches, BMI is 32.2.  Pain in his neck is noted. 
Continues to have pain with flexion and extension, both radiating pain into the
right arm and hand.  There is a positive Spurling's test, radiating into the
right arm.
 
IMPRESSION:  Cervical radiculopathy.
 
PROCEDURE:  C6-C7 cervical epidural steroid injection under fluoroscopic
guidance.
 
After informed consent, he was taken to the fluoroscopic suite, placed prone,
skin prepped with ChloraPrep.  Skin anesthetized over C6-C7.  A 20-gauge Tuohy
epidural needle advanced first attempt into the epidural space with loss of
resistance.  There was no blood or CSF aspirated.  A 1 mL of Omnipaque was
injected.  Good spread of dye observed extending into the epidural space in both
cephalad and caudad distribution.  It was then followed by 3 mL of 0.5%
lidocaine mixed with 80 mg of triamcinolone.  He tolerated the procedure well
and there were no complications.  Followup visit planned as needed.  Medications
were ordered as necessary.  He is very careful and cautious about using any
medications for chronic pain.
 
 
 
                         
   By:                               
                   
D: 10/05/20 1549                           _____________________________________
T: 10/05/20 2113                           Fabio Baez MD           /nt

## 2020-12-15 NOTE — HPC
[facility]
[address]
[place]   [address]                     PAIN MANAGEMENT CONSULTATION  
_______________________________________________________________________________
 
Name:       SADIA PURDY           Room #:                     REG CLLATONIA 
YONG#:      [id number]                       Account #:      [id number]  
Admission:  05/11/20    Attend Phys:    Fabio Baez MD 
Discharge:              Date of Birth:  [birth year]  
                                                          Report #: [id number]
                                                                    [id number]   
_______________________________________________________________________________
THIS REPORT FOR:  
 
cc:  Darian Cadet MD, Scott R. MD Morgan, Richard L. MD                                         ~
CC: Fabio Cadet MD
 
DATE OF SERVICE:  05/11/2020
 
 
Followup visit for cervical radiculopathy.
 
The patient returns today for cervical epidural injection.  He has had 1-3
injections per year, dating back to 2016.
 
I saw him in consultation for this injection on 04/30/2020.  He has discontinued
his blood thinner in anticipation of the injection.  We have reviewed the
procedure, risks and benefits.  He is anxious to proceed.
 
DIAGNOSIS:  Cervical radiculopathy, chronic, right upper extremity involving
C6-C7.
 
PROCEDURE:  Cervical epidural injection.
 
After informed consent, he was taken to the fluoroscopic suite, placed prone,
skin prepped with ChloraPrep.  Skin anesthetized over C6-C7 interspace.  Using
biplanar fluoroscopic views, I advanced needle into the epidural space using
loss of resistance technique.  There was no blood or CSF aspirated.  A 1  mL of
Omnipaque was injected and excellent spread of dye observed in the epidural
space, was followed by 3 mL of 0.5% lidocaine mixed with 60 mg of triamcinolone.
 He tolerated the procedure well and was observed for 45 minutes and discharged.
 Followup visit planned as needed.  No medications ordered at today's visit.
 
 
 
 
 
 
 
 
 
 
  <ELECTRONICALLY SIGNED>
   By: Fabio Baez MD         
  05/12/20     1818
D: 05/11/20 1643                           _____________________________________
T: 05/11/20 1912                           Fabio Baez MD           /nt Placement not confirmed at this time

## 2021-04-29 ENCOUNTER — HOSPITAL ENCOUNTER (OUTPATIENT)
Dept: HOSPITAL 35 - PAIN | Age: 66
End: 2021-04-29
Attending: ANESTHESIOLOGY
Payer: COMMERCIAL

## 2021-04-29 VITALS — BODY MASS INDEX: 31.62 KG/M2 | HEIGHT: 74.02 IN | WEIGHT: 246.4 LBS

## 2021-04-29 VITALS — SYSTOLIC BLOOD PRESSURE: 107 MMHG | DIASTOLIC BLOOD PRESSURE: 68 MMHG

## 2021-04-29 DIAGNOSIS — Z79.891: ICD-10-CM

## 2021-04-29 DIAGNOSIS — Z79.01: ICD-10-CM

## 2021-04-29 DIAGNOSIS — F17.200: ICD-10-CM

## 2021-04-29 DIAGNOSIS — Z79.899: ICD-10-CM

## 2021-04-29 DIAGNOSIS — Z88.1: ICD-10-CM

## 2021-04-29 DIAGNOSIS — I10: ICD-10-CM

## 2021-04-29 DIAGNOSIS — M54.12: Primary | ICD-10-CM

## 2021-04-29 NOTE — NUR
Pain Clinic Assessment:
 
1. History of Osteoarthritis:
Left Lower Extremity
Right Lower Extremity
   History of Rheumatoid Arthritis:
Not Applicable
 
2. Height: 6 ft. 2 in. 188.0 cm.
   Weight: 246.4 lb.  oz. 111.767 kg.
   Patient's BMI: 31.6
 
3. Vital Signs:
   BP: 107/68 Pulse: 85 Resp: 16
   Temp:  02 Sat: 97 ECG Mon:
 
4. Pain Intensity: 8
 
5. Fall Risk:
   Dizziness: N  Needs help standing or walking: N
   Fallen in the last 3 months: Y
   Fall risk comments:
 
 
6. Patient on Blood Thinner: Warfarin (Coumadin)
 
7. History of Hypertension: Y
 
8. Opioid Therapy greater than 6 weeks: Y
   Opiate Contract Signed: 06/11/18
 
9. Risk Assessment Tool Provided: LOW RISK 0/3
 
10. Functional Assessment Tool: 30/70
 
11. Recreational Drug Use: Never Drug Type:
    Tobacco Use: Current Every Day Smoker Tobacco Type: Cigarettes
       Amount or Packs/day: 1/2 PPD How Many Years:
    Alcohol Use: No  Frequency:  Quant:

## 2021-04-29 NOTE — NUR
Notify MD and infection Prevention
STANDARD and CONTACT and DROPLET and EYE PROTECTION.
N95 and negative pressure for aerosolizing procedures.

## 2021-05-06 ENCOUNTER — HOSPITAL ENCOUNTER (OUTPATIENT)
Dept: HOSPITAL 35 - PAIN | Age: 66
Discharge: HOME | End: 2021-05-06
Attending: ANESTHESIOLOGY
Payer: COMMERCIAL

## 2021-05-06 VITALS — HEIGHT: 74.02 IN | WEIGHT: 246 LBS | BODY MASS INDEX: 31.57 KG/M2

## 2021-05-06 VITALS — DIASTOLIC BLOOD PRESSURE: 78 MMHG | SYSTOLIC BLOOD PRESSURE: 108 MMHG

## 2021-05-06 DIAGNOSIS — M19.90: ICD-10-CM

## 2021-05-06 DIAGNOSIS — Z79.899: ICD-10-CM

## 2021-05-06 DIAGNOSIS — F17.210: ICD-10-CM

## 2021-05-06 DIAGNOSIS — G89.29: ICD-10-CM

## 2021-05-06 DIAGNOSIS — Z79.01: ICD-10-CM

## 2021-05-06 DIAGNOSIS — Z88.8: ICD-10-CM

## 2021-05-06 DIAGNOSIS — I10: ICD-10-CM

## 2021-05-06 DIAGNOSIS — M54.12: Primary | ICD-10-CM

## 2021-05-06 DIAGNOSIS — Z98.890: ICD-10-CM

## 2021-05-06 LAB
INR PPP: 1.09
PROTHROMBIN TIME: 11.8 SECONDS (ref 10.5–12.1)

## 2021-05-06 NOTE — NUR
Pain Clinic Assessment:
 
1. History of Osteoarthritis:
Left Lower Extremity
Right Lower Extremity
   History of Rheumatoid Arthritis:
Not Applicable
 
2. Height: 6 ft. 2 in. 188.0 cm.
   Weight: 246.0 lb.  oz. 111.585 kg.
   Patient's BMI: 31.6
 
3. Vital Signs:
   BP: 108/78 Pulse: 97 Resp: 16
   Temp:  02 Sat: 97 ECG Mon:
 
4. Pain Intensity: 8
 
5. Fall Risk:
   Dizziness: N  Needs help standing or walking: N
   Fallen in the last 3 months: N
   Fall risk comments:
 
 
6. Patient on Blood Thinner: Warfarin (Coumadin)
 
7. History of Hypertension: Y
 
8. Opioid Therapy greater than 6 weeks: Y
   Opiate Contract Signed: 06/11/18
 
9. Risk Assessment Tool Provided: LOW RISK 0/3
 
10. Functional Assessment Tool: 30/70
 
11. Recreational Drug Use: Never Drug Type:
    Tobacco Use: Current Every Day Smoker Tobacco Type: Cigarettes
       Amount or Packs/day: 1/2 How Many Years:
    Alcohol Use: No  Frequency:  Quant:

## 2021-08-19 ENCOUNTER — HOSPITAL ENCOUNTER (OUTPATIENT)
Dept: HOSPITAL 35 - PAIN | Age: 66
End: 2021-08-19
Attending: ANESTHESIOLOGY
Payer: COMMERCIAL

## 2021-08-19 VITALS — HEIGHT: 74.02 IN | BODY MASS INDEX: 31.7 KG/M2 | WEIGHT: 247 LBS

## 2021-08-19 VITALS — SYSTOLIC BLOOD PRESSURE: 129 MMHG | DIASTOLIC BLOOD PRESSURE: 84 MMHG

## 2021-08-19 DIAGNOSIS — Z79.899: ICD-10-CM

## 2021-08-19 DIAGNOSIS — M48.02: ICD-10-CM

## 2021-08-19 DIAGNOSIS — M47.22: Primary | ICD-10-CM

## 2021-08-19 DIAGNOSIS — Z88.8: ICD-10-CM

## 2021-08-19 DIAGNOSIS — Z79.891: ICD-10-CM

## 2021-08-19 DIAGNOSIS — Z79.01: ICD-10-CM

## 2021-08-19 NOTE — NUR
Pain Clinic Assessment:
 
1. History of Osteoarthritis:
Left Lower Extremity
Right Lower Extremity
   History of Rheumatoid Arthritis:
Not Applicable
 
2. Height: 6 ft. 2 in. 188.0 cm.
   Weight: 247.0 lb.  oz. 112.039 kg.
   Patient's BMI: 31.7
 
3. Vital Signs:
   BP: 129/84 Pulse: 91 Resp: 16
   Temp:  02 Sat: 97 ECG Mon:
 
4. Pain Intensity: 6-7
 
5. Fall Risk:
   Dizziness: N  Needs help standing or walking: N
   Fallen in the last 3 months: N
   Fall risk comments:
 
 
6. Patient on Blood Thinner: Warfarin (Coumadin)
 
7. History of Hypertension: Y
 
8. Opioid Therapy greater than 6 weeks: Y
   Opiate Contract Signed: 06/11/18
 
9. Risk Assessment Tool Provided: LOW RISK 0/3
 
10. Functional Assessment Tool: 30/70
 
11. Recreational Drug Use: Never Drug Type:
    Tobacco Use: Current Every Day Smoker Tobacco Type: Cigarettes
       Amount or Packs/day: 1/2 How Many Years:
    Alcohol Use: No  Frequency:  Quant:

## 2021-08-30 ENCOUNTER — HOSPITAL ENCOUNTER (OUTPATIENT)
Dept: HOSPITAL 35 - PAIN | Age: 66
Discharge: HOME | End: 2021-08-30
Attending: ANESTHESIOLOGY
Payer: COMMERCIAL

## 2021-08-30 VITALS — HEIGHT: 74.02 IN | BODY MASS INDEX: 32.98 KG/M2 | WEIGHT: 257 LBS

## 2021-08-30 VITALS — DIASTOLIC BLOOD PRESSURE: 79 MMHG | SYSTOLIC BLOOD PRESSURE: 142 MMHG

## 2021-08-30 DIAGNOSIS — G89.29: ICD-10-CM

## 2021-08-30 DIAGNOSIS — M54.12: Primary | ICD-10-CM

## 2021-08-30 DIAGNOSIS — M19.90: ICD-10-CM

## 2021-08-30 DIAGNOSIS — F17.210: ICD-10-CM

## 2021-08-30 DIAGNOSIS — Z98.890: ICD-10-CM

## 2021-08-30 DIAGNOSIS — Z79.899: ICD-10-CM

## 2021-08-30 DIAGNOSIS — I10: ICD-10-CM

## 2021-08-30 DIAGNOSIS — Z88.8: ICD-10-CM

## 2021-08-30 LAB
INR PPP: 1.08
PROTHROMBIN TIME: 11.7 SECONDS (ref 10.5–12.1)

## 2021-08-30 NOTE — NUR
Pain Clinic Assessment:
 
1. History of Osteoarthritis:
Left Lower Extremity
Right Lower Extremity
   History of Rheumatoid Arthritis:
Not Applicable
 
2. Height: 6 ft. 2 in. 188.0 cm.
   Weight: 257.0 lb.  oz. 116.575 kg.
   Patient's BMI: 33.0
 
3. Vital Signs:
   BP: 142/79 Pulse: 87 Resp: 18
   Temp:  02 Sat: 96 ECG Mon:
 
4. Pain Intensity: 6.5
 
5. Fall Risk:
   Dizziness: N  Needs help standing or walking: N
   Fallen in the last 3 months: N
   Fall risk comments:
 
 
6. Patient on Blood Thinner: Warfarin (Coumadin)
 
7. History of Hypertension: Y
 
8. Opioid Therapy greater than 6 weeks: Y
   Opiate Contract Signed: 06/11/18
 
9. Risk Assessment Tool Provided: LOW RISK 0
 
10. Functional Assessment Tool: 30/70
 
11. Recreational Drug Use: Never Drug Type:
    Tobacco Use: Current Every Day Smoker Tobacco Type:
       Amount or Packs/day:  How Many Years:
    Alcohol Use: No  Frequency:  Quant:

## 2021-12-27 ENCOUNTER — HOSPITAL ENCOUNTER (OUTPATIENT)
Dept: HOSPITAL 35 - PAIN | Age: 66
End: 2021-12-27
Attending: ANESTHESIOLOGY
Payer: COMMERCIAL

## 2021-12-27 VITALS — DIASTOLIC BLOOD PRESSURE: 70 MMHG | SYSTOLIC BLOOD PRESSURE: 109 MMHG

## 2021-12-27 VITALS — WEIGHT: 255.8 LBS | HEIGHT: 74.02 IN | BODY MASS INDEX: 32.83 KG/M2

## 2021-12-27 DIAGNOSIS — I10: ICD-10-CM

## 2021-12-27 DIAGNOSIS — Z88.5: ICD-10-CM

## 2021-12-27 DIAGNOSIS — F17.200: ICD-10-CM

## 2021-12-27 DIAGNOSIS — M19.90: ICD-10-CM

## 2021-12-27 DIAGNOSIS — Z88.8: ICD-10-CM

## 2021-12-27 DIAGNOSIS — Z79.899: ICD-10-CM

## 2021-12-27 DIAGNOSIS — M54.12: Primary | ICD-10-CM

## 2021-12-27 NOTE — NUR
Pain Clinic Assessment:
 
1. History of Osteoarthritis:
Left Lower Extremity
Right Lower Extremity
   History of Rheumatoid Arthritis:
Not Applicable
 
2. Height: 6 ft. 2 in. 188.0 cm.
   Weight: 255.8 lb.  oz. 116.030 kg.
   Patient's BMI: 32.8
 
3. Vital Signs:
   BP: 109/70 Pulse: 84 Resp: 20
   Temp:  02 Sat: 96 ECG Mon:
 
4. Pain Intensity: 7
 
5. Fall Risk:
   Dizziness: N  Needs help standing or walking: N
   Fallen in the last 3 months: N
   Fall risk comments:
 
 
6. Patient on Blood Thinner: Warfarin (Coumadin)
 
7. History of Hypertension: Y
 
8. Opioid Therapy greater than 6 weeks: Y
   Opiate Contract Signed: 06/11/18
 
9. Risk Assessment Tool Provided: LOW RISK 0
 
10. Functional Assessment Tool: 30/70
 
11. Recreational Drug Use: Never Drug Type:
    Tobacco Use: Current Every Day Smoker Tobacco Type: Cigarettes
       Amount or Packs/day: 1/2 PACK How Many Years:
    Alcohol Use: No  Frequency:  Quant:

## 2022-02-03 ENCOUNTER — HOSPITAL ENCOUNTER (OUTPATIENT)
Dept: HOSPITAL 35 - PAIN | Age: 67
Discharge: HOME | End: 2022-02-03
Attending: ANESTHESIOLOGY
Payer: COMMERCIAL

## 2022-02-03 VITALS — DIASTOLIC BLOOD PRESSURE: 81 MMHG | SYSTOLIC BLOOD PRESSURE: 132 MMHG

## 2022-02-03 VITALS — WEIGHT: 264 LBS | BODY MASS INDEX: 33.88 KG/M2 | HEIGHT: 74.02 IN

## 2022-02-03 DIAGNOSIS — M19.90: ICD-10-CM

## 2022-02-03 DIAGNOSIS — Z98.890: ICD-10-CM

## 2022-02-03 DIAGNOSIS — M54.12: Primary | ICD-10-CM

## 2022-02-03 DIAGNOSIS — I10: ICD-10-CM

## 2022-02-03 DIAGNOSIS — F17.210: ICD-10-CM

## 2022-02-03 DIAGNOSIS — G89.29: ICD-10-CM

## 2022-02-03 DIAGNOSIS — Z79.01: ICD-10-CM

## 2022-02-03 DIAGNOSIS — Z88.8: ICD-10-CM

## 2022-02-03 DIAGNOSIS — Z79.899: ICD-10-CM

## 2022-02-03 LAB
INR PPP: 1.03
PROTHROMBIN TIME: 11.2 SECONDS (ref 10.5–12.1)

## 2022-02-03 NOTE — NUR
Pain Clinic Assessment:
 
1. History of Osteoarthritis:
Left Lower Extremity
Right Lower Extremity
   History of Rheumatoid Arthritis:
Not Applicable
 
2. Height: 6 ft. 2 in. 188.0 cm.
   Weight: 264.0 lb.  oz. 119.750 kg.
   Patient's BMI: 33.9
 
3. Vital Signs:
   BP: 132/81 Pulse: 82 Resp: 20
   Temp:  02 Sat: 96 ECG Mon:
 
4. Pain Intensity: 8
 
5. Fall Risk:
   Dizziness: N  Needs help standing or walking: N
   Fallen in the last 3 months: N
   Fall risk comments:
 
 
6. Patient on Blood Thinner: Warfarin (Coumadin)
 
7. History of Hypertension: Y
 
8. Opioid Therapy greater than 6 weeks: Y
   Opiate Contract Signed: 06/11/18
 
9. Risk Assessment Tool Provided: LOW RISK 0
 
10. Functional Assessment Tool: 30/70
 
11. Recreational Drug Use: Never Drug Type:
    Tobacco Use: Current Every Day Smoker Tobacco Type:
       Amount or Packs/day:  How Many Years:
    Alcohol Use: No  Frequency:  Quant: